# Patient Record
Sex: FEMALE | Race: BLACK OR AFRICAN AMERICAN | Employment: FULL TIME | URBAN - METROPOLITAN AREA
[De-identification: names, ages, dates, MRNs, and addresses within clinical notes are randomized per-mention and may not be internally consistent; named-entity substitution may affect disease eponyms.]

---

## 2017-03-30 PROBLEM — O26.90 PREGNANCY COMPLICATION, ANTEPARTUM: Status: ACTIVE | Noted: 2017-03-30

## 2017-07-13 ENCOUNTER — OFFICE VISIT (OUTPATIENT)
Dept: INTERNAL MEDICINE CLINIC | Age: 27
End: 2017-07-13

## 2017-07-13 ENCOUNTER — TELEPHONE (OUTPATIENT)
Dept: INTERNAL MEDICINE CLINIC | Age: 27
End: 2017-07-13

## 2017-07-13 VITALS
BODY MASS INDEX: 23.81 KG/M2 | SYSTOLIC BLOOD PRESSURE: 120 MMHG | OXYGEN SATURATION: 98 % | WEIGHT: 129.4 LBS | HEIGHT: 62 IN | RESPIRATION RATE: 18 BRPM | DIASTOLIC BLOOD PRESSURE: 80 MMHG | HEART RATE: 92 BPM

## 2017-07-13 DIAGNOSIS — R53.83 FATIGUE, UNSPECIFIED TYPE: ICD-10-CM

## 2017-07-13 DIAGNOSIS — M25.531 WRIST PAIN, RIGHT: Primary | ICD-10-CM

## 2017-07-13 DIAGNOSIS — Z13.220 SCREENING, LIPID: ICD-10-CM

## 2017-07-13 DIAGNOSIS — Z13.1 SCREENING FOR DIABETES MELLITUS: ICD-10-CM

## 2017-07-13 RX ORDER — ACETAMINOPHEN AND CODEINE PHOSPHATE 120; 12 MG/5ML; MG/5ML
SOLUTION ORAL
Refills: 2 | COMMUNITY
Start: 2017-07-05 | End: 2020-01-13

## 2017-07-13 RX ORDER — PREDNISONE 20 MG/1
20 TABLET ORAL
Qty: 7 TAB | Refills: 0 | Status: SHIPPED | OUTPATIENT
Start: 2017-07-13 | End: 2017-07-29

## 2017-07-13 NOTE — TELEPHONE ENCOUNTER
Pt here today and was seen by Dr Lexa Dahl. At check out pt advised that her mother is a pt of Dr Shirley Lockwood and she would like Dr Shirley Lockwood to be her pcp. She said she made the appoinment with Dr Lexa Dahl due to Dr Shirley Lockwood did not have availabililty until August 2017    She said she scheduled with Dr Lexa Dahl for today's appt due to an acute need.  She said establishing care/and changing pcp was not explained to her     She would like Dr Shirley Lockwood to be her pcp      Please assist

## 2017-07-13 NOTE — PROGRESS NOTES
Chief Complaint   Patient presents with   [email or web address]   Patient is here today as a New Patient. Patient has been having Right wrist and trigger thumb with Popping. pain x 3 mths. Pain is 10/10. Pt sts she has a braclet that she wears for pain.

## 2017-07-13 NOTE — PATIENT INSTRUCTIONS
A Healthy Lifestyle: Care Instructions  Your Care Instructions  A healthy lifestyle can help you feel good, stay at a healthy weight, and have plenty of energy for both work and play. A healthy lifestyle is something you can share with your whole family. A healthy lifestyle also can lower your risk for serious health problems, such as high blood pressure, heart disease, and diabetes. You can follow a few steps listed below to improve your health and the health of your family. Follow-up care is a key part of your treatment and safety. Be sure to make and go to all appointments, and call your doctor if you are having problems. Its also a good idea to know your test results and keep a list of the medicines you take. How can you care for yourself at home? · Do not eat too much sugar, fat, or fast foods. You can still have dessert and treats now and then. The goal is moderation. · Start small to improve your eating habits. Pay attention to portion sizes, drink less juice and soda pop, and eat more fruits and vegetables. ¨ Eat a healthy amount of food. A 3-ounce serving of meat, for example, is about the size of a deck of cards. Fill the rest of your plate with vegetables and whole grains. ¨ Limit the amount of soda and sports drinks you have every day. Drink more water when you are thirsty. ¨ Eat at least 5 servings of fruits and vegetables every day. It may seem like a lot, but it is not hard to reach this goal. A serving or helping is 1 piece of fruit, 1 cup of vegetables, or 2 cups of leafy, raw vegetables. Have an apple or some carrot sticks as an afternoon snack instead of a candy bar. Try to have fruits and/or vegetables at every meal.  · Make exercise part of your daily routine. You may want to start with simple activities, such as walking, bicycling, or slow swimming. Try to be active 30 to 60 minutes every day. You do not need to do all 30 to 60 minutes all at once.  For example, you can exercise 3 times a day for 10 or 20 minutes. Moderate exercise is safe for most people, but it is always a good idea to talk to your doctor before starting an exercise program.  · Keep moving. Cristina Conklin the lawn, work in the garden, or [employer]. Take the stairs instead of the elevator at work. · If you smoke, quit. People who smoke have an increased risk for heart attack, stroke, cancer, and other lung illnesses. Quitting is hard, but there are ways to boost your chance of quitting tobacco for good. ¨ Use nicotine gum, patches, or lozenges. ¨ Ask your doctor about stop-smoking programs and medicines. ¨ Keep trying. In addition to reducing your risk of diseases in the future, you will notice some benefits soon after you stop using tobacco. If you have shortness of breath or asthma symptoms, they will likely get better within a few weeks after you quit. · Limit how much alcohol you drink. Moderate amounts of alcohol (up to 2 drinks a day for men, 1 drink a day for women) are okay. But drinking too much can lead to liver problems, high blood pressure, and other health problems. Family health  If you have a family, there are many things you can do together to improve your health. · Eat meals together as a family as often as possible. · Eat healthy foods. This includes fruits, vegetables, lean meats and dairy, and whole grains. · Include your family in your fitness plan. Most people think of activities such as jogging or tennis as the way to fitness, but there are many ways you and your family can be more active. Anything that makes you breathe hard and gets your heart pumping is exercise. Here are some tips:  ¨ Walk to do errands or to take your child to school or the bus. ¨ Go for a family bike ride after dinner instead of watching TV. Where can you learn more? Go to http://juliana-maxwell.info/. Enter L892 in the search box to learn more about \"A Healthy Lifestyle: Care Instructions. \"  Current as of: July 26, 2016  Content Version: 11.3  © 3724-1603 [employer]. Care instructions adapted under license by [employer] (which disclaims liability or warranty for this information). If you have questions about a medical condition or this instruction, always ask your healthcare professional. [address] any warranty or liability for your use of this information. Fatigue: Care Instructions  Your Care Instructions  Fatigue is a feeling of tiredness, exhaustion, or lack of energy. You may feel fatigue because of too much or not enough activity. It can also come from stress, lack of sleep, boredom, and poor diet. Many medical problems, such as viral infections, can cause fatigue. Emotional problems, especially depression, are often the cause of fatigue. Fatigue is most often a symptom of another problem. Treatment for fatigue depends on the cause. For example, if you have fatigue because you have a certain health problem, treating this problem also treats your fatigue. If depression or anxiety is the cause, treatment may help. Follow-up care is a key part of your treatment and safety. Be sure to make and go to all appointments, and call your doctor if you are having problems. It's also a good idea to know your test results and keep a list of the medicines you take. How can you care for yourself at home? · Get regular exercise. But don't overdo it. Go back and forth between rest and exercise. · Get plenty of rest.  · Eat a healthy diet. Do not skip meals, especially breakfast.  · Reduce your use of caffeine, tobacco, and alcohol. Caffeine is most often found in coffee, tea, cola drinks, and chocolate. · Limit medicines that can cause fatigue. This includes tranquilizers and cold and allergy medicines. When should you call for help?   Watch closely for changes in your health, and be sure to contact your doctor if:  · You have new symptoms such as fever or a rash.  · Your fatigue gets worse. · You have been feeling down, depressed, or hopeless. Or you may have lost interest in things that you usually enjoy. · You are not getting better as expected. Where can you learn more? Go to http://juliana-maxwell.info/. Enter G471 in the search box to learn more about \"Fatigue: Care Instructions. \"  Current as of: March 20, 2017  Content Version: 11.3  © 5776-6631 [employer]. Care instructions adapted under license by [employer] (which disclaims liability or warranty for this information). If you have questions about a medical condition or this instruction, always ask your healthcare professional. [place] [address] any warranty or liability for your use of this information.

## 2017-07-13 NOTE — MR AVS SNAPSHOT
Visit Information Date & Time Provider Department Dept. Phone Encounter #  
 7/13/2017  2:00 PM Billie Almeida DO [facility] 8 [id number] Follow-up Instructions Return in about 1 week (around 7/20/2017) for to review labs. Upcoming Health Maintenance Date Due  
 HPV AGE 9Y-34Y (1 of 3 - Female 3 Dose Series) 12/9/2002 DTaP/Tdap/Td series (1 - Tdap) 12/9/2012 PAP AKA CERVICAL CYTOLOGY 12/9/2012 INFLUENZA AGE 9 TO ADULT 8/1/2017 Allergies as of 7/13/2017  Review Complete On: 7/13/2017 By: Billie Almeida DO No Known Allergies Current Immunizations  Never Reviewed No immunizations on file. Not reviewed this visit You Were Diagnosed With   
  
 Codes Comments Wrist pain, right    -  Primary ICD-10-CM: M25.531 ICD-9-CM: 719.43 Fatigue, unspecified type     ICD-10-CM: R53.83 ICD-9-CM: 780.79 Screening, lipid     ICD-10-CM: Y56.646 ICD-9-CM: V77.91 Screening for diabetes mellitus     ICD-10-CM: Z13.1 ICD-9-CM: V77.1 Vitals BP Pulse Resp Height(growth percentile) Weight(growth percentile) SpO2  
 120/80 (BP 1 Location: Left arm, BP Patient Position: Sitting) 92 18 5' 2\" (1.575 m) 129 lb 6.4 oz (58.7 kg) 98% BMI Smoking Status 23.67 kg/m2 Never Smoker BMI and BSA Data Body Mass Index Body Surface Area  
 23.67 kg/m 2 1.6 m 2 Preferred Pharmacy Pharmacy Name Phone [id number] [place] [address] [phone] Your Updated Medication List  
  
   
This list is accurate as of: 7/13/17  2:57 PM.  Always use your most recent med list.  
  
  
  
  
 norethindrone 0.35 mg Tab Commonly known as:  MICRONOR  
TAKE 1 TABLET BY MOUTH EVERY DAY  
  
 predniSONE 20 mg tablet Commonly known as:  Graydon Marleni Take 1 Tab by mouth daily (with breakfast). Prescriptions Sent to Pharmacy Refills predniSONE (DELTASONE) 20 mg tablet 0 Sig: Take 1 Tab by mouth daily (with breakfast). Class: Normal  
 Pharmacy: [address] [place] [address] #: [phone] Route: Oral  
  
Follow-up Instructions Return in about 1 week (around 7/20/2017) for to review labs. To-Do List   
 07/13/2017 Lab:  CBC WITH AUTOMATED DIFF   
  
 07/13/2017 Lab:  HEMOGLOBIN A1C WITH EAG   
  
 07/13/2017 Lab:  LIPID PANEL   
  
 07/13/2017 Lab:  METABOLIC PANEL, COMPREHENSIVE   
  
 07/13/2017 Lab:  T4, FREE   
  
 07/13/2017 Lab:  TSH 3RD GENERATION   
  
 07/13/2017 Lab:  VITAMIN D, 25 HYDROXY Patient Instructions A Healthy Lifestyle: Care Instructions Your Care Instructions A healthy lifestyle can help you feel good, stay at a healthy weight, and have plenty of energy for both work and play. A healthy lifestyle is something you can share with your whole family. A healthy lifestyle also can lower your risk for serious health problems, such as high blood pressure, heart disease, and diabetes. You can follow a few steps listed below to improve your health and the health of your family. Follow-up care is a key part of your treatment and safety. Be sure to make and go to all appointments, and call your doctor if you are having problems. Its also a good idea to know your test results and keep a list of the medicines you take. How can you care for yourself at home? · Do not eat too much sugar, fat, or fast foods. You can still have dessert and treats now and then. The goal is moderation. · Start small to improve your eating habits. Pay attention to portion sizes, drink less juice and soda pop, and eat more fruits and vegetables. ¨ Eat a healthy amount of food. A 3-ounce serving of meat, for example, is about the size of a deck of cards. Fill the rest of your plate with vegetables and whole grains. ¨ Limit the amount of soda and sports drinks you have every day. Drink more water when you are thirsty. ¨ Eat at least 5 servings of fruits and vegetables every day. It may seem like a lot, but it is not hard to reach this goal. A serving or helping is 1 piece of fruit, 1 cup of vegetables, or 2 cups of leafy, raw vegetables. Have an apple or some carrot sticks as an afternoon snack instead of a candy bar. Try to have fruits and/or vegetables at every meal. 
· Make exercise part of your daily routine. You may want to start with simple activities, such as walking, bicycling, or slow swimming. Try to be active 30 to 60 minutes every day. You do not need to do all 30 to 60 minutes all at once. For example, you can exercise 3 times a day for 10 or 20 minutes. Moderate exercise is safe for most people, but it is always a good idea to talk to your doctor before starting an exercise program. 
· Keep moving. Trung Cue the lawn, work in the garden, or To[employer]. Take the stairs instead of the elevator at work. · If you smoke, quit. People who smoke have an increased risk for heart attack, stroke, cancer, and other lung illnesses. Quitting is hard, but there are ways to boost your chance of quitting tobacco for good. ¨ Use nicotine gum, patches, or lozenges. ¨ Ask your doctor about stop-smoking programs and medicines. ¨ Keep trying. In addition to reducing your risk of diseases in the future, you will notice some benefits soon after you stop using tobacco. If you have shortness of breath or asthma symptoms, they will likely get better within a few weeks after you quit. · Limit how much alcohol you drink. Moderate amounts of alcohol (up to 2 drinks a day for men, 1 drink a day for women) are okay. But drinking too much can lead to liver problems, high blood pressure, and other health problems. Family health If you have a family, there are many things you can do together to improve your health. · Eat meals together as a family as often as possible. · Eat healthy foods. This includes fruits, vegetables, lean meats and dairy, and whole grains. · Include your family in your fitness plan. Most people think of activities such as jogging or tennis as the way to fitness, but there are many ways you and your family can be more active. Anything that makes you breathe hard and gets your heart pumping is exercise. Here are some tips: 
¨ Walk to do errands or to take your child to school or the bus. ¨ Go for a family bike ride after dinner instead of watching TV. Where can you learn more? Go to http://julianaKapostmaxwell.info/. Enter L720 in the search box to learn more about \"A Healthy Lifestyle: Care Instructions. \" Current as of: July 26, 2016 Content Version: 11.3 © 7699-5205 [employer]. Care instructions adapted under license by [employer] (which disclaims liability or warranty for this information). If you have questions about a medical condition or this instruction, always ask your healthcare professional. [address] any warranty or liability for your use of this information. Fatigue: Care Instructions Your Care Instructions Fatigue is a feeling of tiredness, exhaustion, or lack of energy. You may feel fatigue because of too much or not enough activity. It can also come from stress, lack of sleep, boredom, and poor diet. Many medical problems, such as viral infections, can cause fatigue. Emotional problems, especially depression, are often the cause of fatigue. Fatigue is most often a symptom of another problem. Treatment for fatigue depends on the cause. For example, if you have fatigue because you have a certain health problem, treating this problem also treats your fatigue. If depression or anxiety is the cause, treatment may help. Follow-up care is a key part of your treatment and safety.  Be sure to make and go to all appointments, and call your doctor if you are having problems. It's also a good idea to know your test results and keep a list of the medicines you take. How can you care for yourself at home? · Get regular exercise. But don't overdo it. Go back and forth between rest and exercise. · Get plenty of rest. 
· Eat a healthy diet. Do not skip meals, especially breakfast. 
· Reduce your use of caffeine, tobacco, and alcohol. Caffeine is most often found in coffee, tea, cola drinks, and chocolate. · Limit medicines that can cause fatigue. This includes tranquilizers and cold and allergy medicines. When should you call for help? Watch closely for changes in your health, and be sure to contact your doctor if: 
· You have new symptoms such as fever or a rash. · Your fatigue gets worse. · You have been feeling down, depressed, or hopeless. Or you may have lost interest in things that you usually enjoy. · You are not getting better as expected. Where can you learn more? Go to http://juliana-maxwell.info/. Enter H560 in the search box to learn more about \"Fatigue: Care Instructions. \" Current as of: March 20, 2017 Content Version: 11.3 © 8939-6303 [employer]. Care instructions adapted under license by [employer] (which disclaims liability or warranty for this information). If you have questions about a medical condition or this instruction, always ask your healthcare professional. [place] [address] any warranty or liability for your use of this information. Introducing [facility]! [employer] introduces MysteryD patient portal. Now you can access parts of your medical record, email your doctor's office, and request medication refills online. 1. In your internet browser, go to [email or web address] 2. Click on the First Time User? Click Here link in the Sign In box. You will see the New Member Sign Up page. 3. Enter your [employer] Access Code exactly as it appears below. You will not need to use this code after youve completed the sign-up process. If you do not sign up before the expiration date, you must request a new code. · [employer] Access Code: [email or web address] Expires: 10/11/2017  2:56 PM 
 
4. Enter the last four digits of your Social Security Number (xxxx) and Date of Birth (mm/dd/yyyy) as indicated and click Submit. You will be taken to the next sign-up page. 5. Create a [employer] ID. This will be your [employer] login ID and cannot be changed, so think of one that is secure and easy to remember. 6. Create a [employer] password. You can change your password at any time. 7. Enter your Password Reset Question and Answer. This can be used at a later time if you forget your password. 8. Enter your e-mail address. You will receive e-mail notification when new information is available in [address]. 9. Click Sign Up. You can now view and download portions of your medical record. 10. Click the Download Summary menu link to download a portable copy of your medical information. If you have questions, please visit the Frequently Asked Questions section of the [employer] website. Remember, [employer] is NOT to be used for urgent needs. For medical emergencies, dial 911. Now available from your iPhone and Android! Please provide this summary of care documentation to your next provider. If you have any questions after today's visit, please call [phone].

## 2017-07-13 NOTE — PROGRESS NOTES
Chief Complaint   Patient presents with    \[facility]\"" Care       HPI:  Patient is a 23year old [facility] [place] female presents today as a new patient to [facility]. She has no significant medical problems and takes no routine medications with the exception of birth control pills. She endorsed occasional fatigue and mild intermittent right wrist pain site of IV placement when she had her baby, but otherwise feels well and denies CP, SOB, orthopnea, PND, fever, chills, headache, dizziness, visual disturbances, syncope, abdominal pain, diarrhea, cough, rectal bleeding, urinary symptoms, back or flank pain, pain or weakness in extremities, sore throat, ear aches, or other complaints today. She just had a baby son 3 months ago and is currently breast feeding. She consumes ethanol occasionally but none currently, but denies tobacco or illicit drug use. She is single and is employed as a  at a hair salon. Parents are alive with no significant med hx. Last pap was several months ago with repeat scheduled for 10/2017 and her GYN Dr. Sonya Paredes. History reviewed. No pertinent past medical history. No Known Allergies    Current Outpatient Prescriptions   Medication Sig Dispense Refill    norethindrone (MICRONOR) 0.35 mg tab TAKE 1 TABLET BY MOUTH EVERY DAY  2    predniSONE (DELTASONE) 20 mg tablet Take 1 Tab by mouth daily (with breakfast).  7 Tab 0     Past Surgical History:   Procedure Laterality Date    HX GYN       Family History   Problem Relation Age of Onset    Hypertension Mother     No Known Problems Sister      Social History     Social History    Marital status: SINGLE     Spouse name: N/A    Number of children: N/A    Years of education: N/A     Social History Main Topics    Smoking status: Never Smoker    Smokeless tobacco: Never Used    Alcohol use No    Drug use: No    Sexual activity: Yes     Partners: Male     Birth control/ protection: Pill     Other Topics Concern    None     Social History Narrative    None         ROS:  Constitutional: Positive for fatigue. Negative for fever or chills  HEENT: Negative for headache, dizziness, visual disturbance, nasal congestion, ear pain, sore throat  Skin: Negative for rash, bruises, lesions  Lungs:  Negative for SOB, cough, mucus production  Heart: Negative for chest pain, chest discomfort  Abdomen: Negative for abdominal pain, N/V, diarrhea, constipation  Genitourinary: Negative for pain on urination, blood in urine, vaginal bleeding, vaginal discharge  Neurological: Negative for numbness in extremities, tremors, confusion, speech disturbance, gait imbalance, weakness  Psychological:Negative for depression, mood changes, anxiety, sleep disturbance  Heme: Negative for easy bruisability or bleeding  Endo: Negative for heat or cold intolerance, frequency with urination, or change in appetite  Musculoskeletal; Positive for mild right wrist pain.         Physical Exam:  Visit Vitals    /80 (BP 1 Location: Left arm, BP Patient Position: Sitting)    Pulse 92    Resp 18    Ht 5' 2\" (1.575 m)    Wt 129 lb 6.4 oz (58.7 kg)    SpO2 98%    BMI 23.67 kg/m2     General: a & o x 3, afebrile, well-nourished, interacting appropriately, in no acute distress  HEENT: head normocephalic and atraumatic, conjuctiva clear, PERRLA, EOMI, nose clear, nasal turbinates with no swelling or erythemapharynx and tonsils with no erythema or exudates, no tongue swelling  Mouth: oral mucosa moist and no oral lesions noted  Skin: color race-appropriate, warm and dry, no rashes , no bruises  Neck: supple, symmetrical, no palpable mass, no thyromegaly, no carotid bruits, no JVD  Respiratory: symmetrical chest expansion, lung sounds clear, good air entry, normal respiratory effort, no wheezes or crackles  Cardiovascular: normal S1S2, regular rate and rhythm, no murmurs, pulses palpable, no edema, no carotid or abdominal bruits   Abdomen: non-distended, normoactive bowel sounds x 4 quadrants, soft, non-tender to palpation  Musculoskeletal: Mild tenderness noted on right wrist  Lymphatic: no cervical lymphadenopathy   Neurological: DTRs intact symmetrically and bilaterally, sensation and motor function intact  Psychological: Appropriate mood and affect, no thought disorder         Assessment/Plan:    ICD-10-CM ICD-9-CM    1. Wrist pain, right M25.531 719.43 predniSONE (DELTASONE) 20 mg tablet   2. Fatigue, unspecified type Q57.22 161.63 METABOLIC PANEL, COMPREHENSIVE      CBC WITH AUTOMATED DIFF      VITAMIN D, 25 HYDROXY      TSH 3RD GENERATION      T4, FREE   3. Screening, lipid Z13.220 V77.91 LIPID PANEL   4. Screening for diabetes mellitus Z13.1 V77.1 HEMOGLOBIN A1C WITH EAG           Orders Placed This Encounter    METABOLIC PANEL, COMPREHENSIVE     Standing Status:   Future     Standing Expiration Date:   7/14/2018    CBC WITH AUTOMATED DIFF     Standing Status:   Future     Standing Expiration Date:   7/14/2018    LIPID PANEL     Standing Status:   Future     Standing Expiration Date:   7/14/2018    VITAMIN D, 25 HYDROXY     Standing Status:   Future     Standing Expiration Date:   7/27/2017    TSH 3RD GENERATION     Standing Status:   Future     Standing Expiration Date:   7/14/2018    T4, FREE     Standing Status:   Future     Standing Expiration Date:   7/14/2018    HEMOGLOBIN A1C WITH EAG     Standing Status:   Future     Standing Expiration Date:   7/14/2018    norethindrone (MICRONOR) 0.35 mg tab     Sig: TAKE 1 TABLET BY MOUTH EVERY DAY     Refill:  2    predniSONE (DELTASONE) 20 mg tablet     Sig: Take 1 Tab by mouth daily (with breakfast). Dispense:  7 Tab     Refill:  0         Additional Notes: Discussed today's diagnosis, treatment plans. Discussed medication indications and side effects. After Visit Summary: Discussed provided printed patient instructions. Answered questions accordingly.   Follow-up Disposition: In 1 week to review labs      Eleonora Conde DO, MPH  Internal Medicine

## 2017-07-17 ENCOUNTER — TELEPHONE (OUTPATIENT)
Dept: INTERNAL MEDICINE CLINIC | Age: 27
End: 2017-07-17

## 2017-07-29 ENCOUNTER — HOSPITAL ENCOUNTER (EMERGENCY)
Age: 27
Discharge: HOME | End: 2017-07-29
Attending: EMERGENCY MEDICINE
Payer: COMMERCIAL

## 2017-07-29 VITALS
RESPIRATION RATE: 18 BRPM | SYSTOLIC BLOOD PRESSURE: 110 MMHG | TEMPERATURE: 97.6 F | BODY MASS INDEX: 23.74 KG/M2 | DIASTOLIC BLOOD PRESSURE: 73 MMHG | HEART RATE: 74 BPM | OXYGEN SATURATION: 98 % | WEIGHT: 129 LBS | HEIGHT: 62 IN

## 2017-07-29 DIAGNOSIS — L72.9 CYST OF SKIN: Primary | ICD-10-CM

## 2017-07-29 PROCEDURE — 99282 EMERGENCY DEPT VISIT SF MDM: CPT

## 2017-07-29 NOTE — ED PROVIDER NOTES
HPI Comments: Patient is a 24 y/o female who presents to the ER c/o bump on her left forearm. Patient states she first noticed it 5 days ago. She reports recently using that arm to carry her child carrier. She denied any warmth, erythema, drainage, pus and has no other complaints. Patient is a 23 y.o. female presenting with skin problem. The history is provided by the patient. Skin Problem           History reviewed. No pertinent past medical history. Past Surgical History:   Procedure Laterality Date    HX  SECTION  2017    HX GYN           Family History:   Problem Relation Age of Onset    Hypertension Mother     No Known Problems Sister        Social History     Social History    Marital status: SINGLE     Spouse name: N/A    Number of children: N/A    Years of education: N/A     Occupational History    Not on file. Social History Main Topics    Smoking status: Never Smoker    Smokeless tobacco: Never Used    Alcohol use No    Drug use: No    Sexual activity: Yes     Partners: Male     Birth control/ protection: Pill     Other Topics Concern    Not on file     Social History Narrative    ** Merged History Encounter **              ALLERGIES: Review of patient's allergies indicates no known allergies. Review of Systems   Constitutional: Negative for chills, fatigue and fever. HENT: Negative. Negative for postnasal drip and sore throat. Eyes: Negative. Respiratory: Negative for cough and shortness of breath. Cardiovascular: Negative for chest pain and palpitations. Gastrointestinal: Negative for abdominal pain, nausea and vomiting. Genitourinary: Negative for dysuria. Musculoskeletal: Negative. Skin:        Bump on left forearm   Neurological: Negative for dizziness, weakness, light-headedness and headaches. Psychiatric/Behavioral: Negative. All other systems reviewed and are negative.       Vitals:    17 1844   BP: 110/73   Pulse: 74 Resp: 18   Temp: 97.6 °F (36.4 °C)   SpO2: 98%   Weight: 58.5 kg (129 lb)   Height: 5' 2\" (1.575 m)            Physical Exam   Constitutional: She is oriented to person, place, and time. She appears well-developed and well-nourished. No distress. HENT:   Head: Normocephalic and atraumatic. Mouth/Throat: Oropharynx is clear and moist.   Eyes: Conjunctivae are normal. No scleral icterus. Neck: Neck supple. No JVD present. No tracheal deviation present. Cardiovascular: Normal rate, regular rhythm and normal heart sounds. Pulmonary/Chest: Effort normal and breath sounds normal. No respiratory distress. She has no wheezes. Abdominal: Soft. There is no tenderness. Musculoskeletal: Normal range of motion. Neurological: She is alert and oriented to person, place, and time. She has normal strength. Gait normal. GCS eye subscore is 4. GCS verbal subscore is 5. GCS motor subscore is 6. Skin: Skin is warm and dry. She is not diaphoretic. Psychiatric: She has a normal mood and affect. Nursing note and vitals reviewed. MDM  Number of Diagnoses or Management Options  Cyst of skin:   Diagnosis management comments: 6:55 PM  24 y/o female c/o bump to left forearm x 5 days. No previous hx of same. States recently had a baby, and has been carrying the car seat in her left arm. Denied any fevers, chills, drainage, and has no other complaints. Based on PE, findings consistent with cyst.  Very firm to touch. Advised will need removal, however not in ER. Will give dermatology and general surgery follow up. All questions answered and patient in agreement with plan of care. Will plan for discharge.   Gio Brown PA-C    Clinical Impression:  Cyst        Risk of Complications, Morbidity, and/or Mortality  Presenting problems: low  Diagnostic procedures: low  Management options: low    Patient Progress  Patient progress: stable    ED Course       Procedures           Vitals:  Patient Vitals for the past 12 hrs:   Temp Pulse Resp BP SpO2   07/29/17 1844 97.6 °F (36.4 °C) 74 18 110/73 98 %         Medications ordered:   Medications - No data to display      Lab findings:  No results found for this or any previous visit (from the past 12 hour(s)). EKG interpretation by ED Physician:      X-Ray, CT or other radiology findings or impressions:  No orders to display       Progress notes, Consult notes or additional Procedure notes:       Reevaluation of patient:       Disposition:  Diagnosis:   1. Cyst of skin        Disposition: Discharged    Follow-up Information     Follow up With Details Comments Contact Info    [facility] EMERGENCY DEPT  If symptoms worsen [address] [address]  [phone]           Patient's Medications   Start Taking    No medications on file   Continue Taking    FERROUS SULFATE (IRON, FERROUS SULFATE,) 325 MG (65 MG IRON) TABLET    Take 325 mg by mouth Daily (before breakfast). NORETHINDRONE (MICRONOR) 0.35 MG TAB    TAKE 1 TABLET BY MOUTH EVERY DAY    EPF53/[id number] FUM/FOLIC ACID (PRENATAL PO)    Take 1 Tab by mouth. These Medications have changed    No medications on file   Stop Taking    OXYCODONE-ACETAMINOPHEN (PERCOCET) 5-325 MG PER TABLET    Take 1-2 Tabs by mouth every six (6) hours as needed. Max Daily Amount: 8 Tabs. PREDNISONE (DELTASONE) 20 MG TABLET    Take 1 Tab by mouth daily (with breakfast).

## 2017-07-29 NOTE — ED TRIAGE NOTES
Pt presents to the ED with swelling on the inner aspect of the left forearm. Pt states some tenderness with palpation. No drainage or redness is observed.  Pt appears in NOAD

## 2019-09-25 PROBLEM — Z13.1 SCREENING FOR DIABETES MELLITUS: Status: RESOLVED | Noted: 2017-07-13 | Resolved: 2019-09-25

## 2022-03-18 PROBLEM — O26.90 PREGNANCY COMPLICATION, ANTEPARTUM: Status: ACTIVE | Noted: 2017-03-30

## 2022-03-20 PROBLEM — M25.531 WRIST PAIN, RIGHT: Status: ACTIVE | Noted: 2017-07-13

## 2022-03-20 PROBLEM — R53.83 FATIGUE: Status: ACTIVE | Noted: 2017-07-13

## 2023-01-31 RX ORDER — NORGESTIMATE AND ETHINYL ESTRADIOL 0.25-0.035
1 KIT ORAL EVERY EVENING
COMMUNITY